# Patient Record
Sex: FEMALE | Race: WHITE | NOT HISPANIC OR LATINO | Employment: OTHER | URBAN - NONMETROPOLITAN AREA
[De-identification: names, ages, dates, MRNs, and addresses within clinical notes are randomized per-mention and may not be internally consistent; named-entity substitution may affect disease eponyms.]

---

## 2023-12-03 PROCEDURE — 93010 ELECTROCARDIOGRAM REPORT: CPT | Performed by: INTERNAL MEDICINE

## 2023-12-15 PROBLEM — R00.2 PALPITATIONS: Status: ACTIVE | Noted: 2023-12-15

## 2023-12-15 PROBLEM — G45.9 TIA (TRANSIENT ISCHEMIC ATTACK): Status: ACTIVE | Noted: 2023-12-15

## 2023-12-15 PROBLEM — I65.23 BILATERAL CAROTID ARTERY STENOSIS: Status: ACTIVE | Noted: 2023-12-15

## 2023-12-15 PROBLEM — R63.6 UNDERWEIGHT: Status: ACTIVE | Noted: 2023-12-15

## 2023-12-15 PROBLEM — I25.10 ATHEROSCLEROSIS OF CORONARY ARTERY OF NATIVE HEART WITHOUT ANGINA PECTORIS: Status: ACTIVE | Noted: 2023-12-15

## 2023-12-15 PROBLEM — E78.5 HYPERLIPIDEMIA: Status: ACTIVE | Noted: 2023-12-15

## 2023-12-15 PROBLEM — Z98.61 STATUS POST CORONARY ANGIOPLASTY: Status: ACTIVE | Noted: 2023-12-15

## 2023-12-15 PROBLEM — E03.9 HYPOTHYROIDISM: Status: ACTIVE | Noted: 2023-12-15

## 2023-12-15 RX ORDER — CLOPIDOGREL BISULFATE 75 MG/1
1 TABLET ORAL DAILY
COMMUNITY
Start: 2021-07-14

## 2023-12-15 RX ORDER — LEVOTHYROXINE SODIUM 50 UG/1
50 TABLET ORAL
COMMUNITY
Start: 2021-09-28

## 2023-12-15 RX ORDER — ATORVASTATIN CALCIUM 40 MG/1
1 TABLET, FILM COATED ORAL NIGHTLY
COMMUNITY
Start: 2021-06-15

## 2023-12-15 RX ORDER — ASPIRIN 81 MG/1
1 TABLET ORAL DAILY
COMMUNITY

## 2023-12-15 RX ORDER — MECLIZINE HYDROCHLORIDE 25 MG/1
12.5 TABLET ORAL 3 TIMES DAILY PRN
COMMUNITY
End: 2023-12-28

## 2023-12-15 RX ORDER — NITROGLYCERIN 0.4 MG/1
TABLET SUBLINGUAL
COMMUNITY

## 2023-12-15 RX ORDER — MIRTAZAPINE 15 MG/1
TABLET, FILM COATED ORAL
COMMUNITY
Start: 2021-06-03 | End: 2023-12-28

## 2023-12-28 ENCOUNTER — OFFICE VISIT (OUTPATIENT)
Dept: CARDIOLOGY | Facility: CLINIC | Age: 88
End: 2023-12-28
Payer: COMMERCIAL

## 2023-12-28 VITALS
HEIGHT: 66 IN | HEART RATE: 60 BPM | BODY MASS INDEX: 15.43 KG/M2 | WEIGHT: 96 LBS | SYSTOLIC BLOOD PRESSURE: 110 MMHG | DIASTOLIC BLOOD PRESSURE: 68 MMHG

## 2023-12-28 DIAGNOSIS — I65.23 BILATERAL CAROTID ARTERY STENOSIS: ICD-10-CM

## 2023-12-28 DIAGNOSIS — G45.9 TIA (TRANSIENT ISCHEMIC ATTACK): ICD-10-CM

## 2023-12-28 DIAGNOSIS — I48.0 PAROXYSMAL ATRIAL FIBRILLATION (MULTI): ICD-10-CM

## 2023-12-28 DIAGNOSIS — R63.6 UNDERWEIGHT: ICD-10-CM

## 2023-12-28 DIAGNOSIS — E78.2 MIXED HYPERLIPIDEMIA: ICD-10-CM

## 2023-12-28 DIAGNOSIS — I25.10 ATHEROSCLEROSIS OF CORONARY ARTERY OF NATIVE HEART WITHOUT ANGINA PECTORIS, UNSPECIFIED VESSEL OR LESION TYPE: ICD-10-CM

## 2023-12-28 DIAGNOSIS — Z98.61 STATUS POST CORONARY ANGIOPLASTY: ICD-10-CM

## 2023-12-28 PROCEDURE — 99214 OFFICE O/P EST MOD 30 MIN: CPT | Performed by: INTERNAL MEDICINE

## 2023-12-28 PROCEDURE — 1159F MED LIST DOCD IN RCRD: CPT | Performed by: INTERNAL MEDICINE

## 2023-12-28 PROCEDURE — 1036F TOBACCO NON-USER: CPT | Performed by: INTERNAL MEDICINE

## 2023-12-28 PROCEDURE — 93000 ELECTROCARDIOGRAM COMPLETE: CPT | Performed by: INTERNAL MEDICINE

## 2023-12-28 RX ORDER — BENZONATATE 100 MG/1
100 CAPSULE ORAL 3 TIMES DAILY PRN
COMMUNITY

## 2023-12-28 RX ORDER — METOPROLOL TARTRATE 25 MG/1
25 TABLET, FILM COATED ORAL DAILY
COMMUNITY
End: 2023-12-28 | Stop reason: SDUPTHER

## 2023-12-28 RX ORDER — METOPROLOL TARTRATE 25 MG/1
25 TABLET, FILM COATED ORAL DAILY
Qty: 90 TABLET | Refills: 3 | Status: SHIPPED | OUTPATIENT
Start: 2023-12-28 | End: 2024-01-09 | Stop reason: SDUPTHER

## 2023-12-28 RX ORDER — GUAIFENESIN 600 MG/1
1200 TABLET, EXTENDED RELEASE ORAL 2 TIMES DAILY
COMMUNITY

## 2023-12-28 NOTE — PATIENT INSTRUCTIONS
Please bring all medicines, vitamins, and herbal supplements with you when you come to the office.    Prescriptions will not be filled unless you are compliant with your follow up appointments or have a follow up appointment scheduled as per instruction of your physician. Refills should be requested at the time of your visit.     EKG done in office today   Same medications  Follow up

## 2023-12-28 NOTE — PROGRESS NOTES
Reason for referral: AFIB    HPI:        Patient is in the office for follow-up for the problems noted below.  She was in the hospital recently with a fall that was accidental and had COVID infection complicated by atrial fibrillation with spontaneous conversion to sinus rhythm.  She came with her 2 sons and she lives with one of them.  She denies any symptoms of chest pain palpitations orthopnea PND or lower extremity edema and she is still independent.  Her medical therapy was reviewed and the admission information was shared with the patient and her sons.  She had an echocardiogram during the admission which was unremarkable.  Her examination today was unremarkable as well.  There will be no changes to her medical therapy.     ASSESSMENT AND PLAN:      1. History of transient ischemic attack, patient had no recurrences on dual antiplatelet therapy.  2. Bilateral carotid disease, less than 70%., Currently on dual antiplatelet therapy and on statin high intensity which we will continue, lipid profile is under control.  3. Hyperlipidemia, will continue  statin using atorvastatin and will follow lipid profile  4. Underweight. Encouraged the patient to gain at least 10 pounds to bring her BMI up.  5. History of coronary artery disease with previous balloon angioplasty more than 30 years ago in [facility] with no indication of recurrent disease of any significant based on cardiac catheterization in December 2018 but with heavy coronary calcifications  6. Hypothyroidism on replacement therapy being monitored  7.  An episode of atrial fibrillation while having COVID infection with spontaneous return to sinus rhythm and no need for anticoagulation or antiarrhythmic therapy.        Bassam Pascual MD, [facility]     Past Medical History:   She has no past medical history on file.    Surgical History:   She has a past surgical history that includes Other surgical history (04/17/2022); Other surgical history  "(04/17/2022); Other surgical history (04/17/2022); Other surgical history (04/17/2022); and Other surgical history (04/17/2022).    Family History:   Family History   Problem Relation Name Age of Onset    No Known Problems Mother      No Known Problems Father      No Known Problems Sister      No Known Problems Brother         Social History:   Social History     Tobacco Use    Smoking status: Never    Smokeless tobacco: Never   Substance Use Topics    Alcohol use: Never        Allergies:  Patient has no known allergies.     Current Medications:    Current Outpatient Medications:     albuterol 90 mcg/actuation aerosol powdr breath activated inhaler, Inhale 2 puffs every 6 hours if needed for wheezing., Disp: , Rfl:     aspirin 81 mg EC tablet, Take 1 tablet (81 mg) by mouth once daily., Disp: , Rfl:     atorvastatin (Lipitor) 40 mg tablet, Take 1 tablet (40 mg) by mouth once daily at bedtime., Disp: , Rfl:     benzonatate (Tessalon) 100 mg capsule, Take 1 capsule (100 mg) by mouth 3 times a day as needed for cough. Do not crush or chew., Disp: , Rfl:     clopidogrel (Plavix) 75 mg tablet, Take 1 tablet (75 mg) by mouth once daily., Disp: , Rfl:     guaiFENesin (Mucinex) 600 mg 12 hr tablet, Take 2 tablets (1,200 mg) by mouth 2 times a day. Do not crush, chew, or split., Disp: , Rfl:     levothyroxine (Synthroid, Levoxyl) 50 mcg tablet, Take 1 tablet (50 mcg) by mouth once daily in the morning. Take before meals., Disp: , Rfl:     nitroglycerin (Nitrostat) 0.4 mg SL tablet, Place under the tongue., Disp: , Rfl:     metoprolol tartrate (Lopressor) 25 mg tablet, Take 1 tablet (25 mg) by mouth once daily., Disp: 90 tablet, Rfl: 3     Vitals:  Visit Vitals  /68 (BP Location: Right arm, Patient Position: Sitting)   Pulse 60   Ht 1.664 m (5' 5.5\")   Wt (!) 43.5 kg (96 lb)   BMI 15.73 kg/m²   Smoking Status Never   BSA 1.42 m²         Review of Systems   All other systems reviewed and are negative.      Objective "         Physical Exam  Constitutional:       Appearance: Normal appearance. She is normal weight.   HENT:      Nose: Nose normal.   Neck:      Vascular: No carotid bruit.   Cardiovascular:      Rate and Rhythm: Normal rate.      Pulses: Normal pulses.      Heart sounds: Normal heart sounds.   Pulmonary:      Effort: Pulmonary effort is normal.   Abdominal:      General: Bowel sounds are normal.      Palpations: Abdomen is soft.   Genitourinary:     Rectum: Normal.   Musculoskeletal:         General: Normal range of motion.      Cervical back: Normal range of motion.      Right lower leg: No edema.      Left lower leg: No edema.   Skin:     General: Skin is warm and dry.   Neurological:      General: No focal deficit present.      Mental Status: She is alert.   Psychiatric:         Mood and Affect: Mood normal.         Behavior: Behavior normal.         Thought Content: Thought content normal.         Judgment: Judgment normal.              EKG done in office today   Assessment and Plan:   1. Paroxysmal atrial fibrillation (CMS/HCC)  Follow Up In Cardiology    ECG 12 Lead    Basic Metabolic Panel    CBC    metoprolol tartrate (Lopressor) 25 mg tablet    Basic Metabolic Panel    CBC      2. Atherosclerosis of coronary artery of native heart without angina pectoris, unspecified vessel or lesion type  Follow Up In Cardiology    Alanine Aminotransferase    Aspartate Aminotransferase    CBC    Lipid Panel    metoprolol tartrate (Lopressor) 25 mg tablet    Alanine Aminotransferase    Aspartate Aminotransferase    CBC    Lipid Panel      3. Status post coronary angioplasty  Follow Up In Cardiology      4. Bilateral carotid artery stenosis        5. Mixed hyperlipidemia  Alanine Aminotransferase    Aspartate Aminotransferase    Lipid Panel    Alanine Aminotransferase    Aspartate Aminotransferase    Lipid Panel      6. Underweight        7. TIA (transient ischemic attack)

## 2023-12-28 NOTE — LETTER
December 28, 2023     Jessika Schwartz DO  [address]  [place] [address]    Patient: Mary Marquez   YOB: 1932   Date of Visit: 12/28/2023       Dear Dr. Jessika Schwartz DO:    Thank you for referring Mary Marquez to me for evaluation. Below are my notes for this consultation.  If you have questions, please do not hesitate to call me. I look forward to following your patient along with you.       Sincerely,     Bassam Pascual MD      CC: No Recipients  ______________________________________________________________________________________        Reason for referral: AFIB    HPI:        Patient is in the office for follow-up for the problems noted below.  She was in the hospital recently with a fall that was accidental and had COVID infection complicated by atrial fibrillation with spontaneous conversion to sinus rhythm.  She came with her 2 sons and she lives with one of them.  She denies any symptoms of chest pain palpitations orthopnea PND or lower extremity edema and she is still independent.  Her medical therapy was reviewed and the admission information was shared with the patient and her sons.  She had an echocardiogram during the admission which was unremarkable.  Her examination today was unremarkable as well.  There will be no changes to her medical therapy.     ASSESSMENT AND PLAN:      1. History of transient ischemic attack, patient had no recurrences on dual antiplatelet therapy.  2. Bilateral carotid disease, less than 70%., Currently on dual antiplatelet therapy and on statin high intensity which we will continue, lipid profile is under control.  3. Hyperlipidemia, will continue  statin using atorvastatin and will follow lipid profile  4. Underweight. Encouraged the patient to gain at least 10 pounds to bring her BMI up.  5. History of coronary artery disease with previous balloon angioplasty more than 30 years ago in [facility] with no indication of  recurrent disease of any significant based on cardiac catheterization in December 2018 but with heavy coronary calcifications  6. Hypothyroidism on replacement therapy being monitored  7.  An episode of atrial fibrillation while having COVID infection with spontaneous return to sinus rhythm and no need for anticoagulation or antiarrhythmic therapy.        Bassam Pascual MD, [facility]     Past Medical History:   She has no past medical history on file.    Surgical History:   She has a past surgical history that includes Other surgical history (04/17/2022); Other surgical history (04/17/2022); Other surgical history (04/17/2022); Other surgical history (04/17/2022); and Other surgical history (04/17/2022).    Family History:   Family History   Problem Relation Name Age of Onset   • No Known Problems Mother     • No Known Problems Father     • No Known Problems Sister     • No Known Problems Brother         Social History:   Social History     Tobacco Use   • Smoking status: Never   • Smokeless tobacco: Never   Substance Use Topics   • Alcohol use: Never        Allergies:  Patient has no known allergies.     Current Medications:    Current Outpatient Medications:   •  albuterol 90 mcg/actuation aerosol powdr breath activated inhaler, Inhale 2 puffs every 6 hours if needed for wheezing., Disp: , Rfl:   •  aspirin 81 mg EC tablet, Take 1 tablet (81 mg) by mouth once daily., Disp: , Rfl:   •  atorvastatin (Lipitor) 40 mg tablet, Take 1 tablet (40 mg) by mouth once daily at bedtime., Disp: , Rfl:   •  benzonatate (Tessalon) 100 mg capsule, Take 1 capsule (100 mg) by mouth 3 times a day as needed for cough. Do not crush or chew., Disp: , Rfl:   •  clopidogrel (Plavix) 75 mg tablet, Take 1 tablet (75 mg) by mouth once daily., Disp: , Rfl:   •  guaiFENesin (Mucinex) 600 mg 12 hr tablet, Take 2 tablets (1,200 mg) by mouth 2 times a day. Do not crush, chew, or split., Disp: , Rfl:   •  levothyroxine (Synthroid, Levoxyl) 50 mcg  "tablet, Take 1 tablet (50 mcg) by mouth once daily in the morning. Take before meals., Disp: , Rfl:   •  nitroglycerin (Nitrostat) 0.4 mg SL tablet, Place under the tongue., Disp: , Rfl:   •  metoprolol tartrate (Lopressor) 25 mg tablet, Take 1 tablet (25 mg) by mouth once daily., Disp: 90 tablet, Rfl: 3     Vitals:  Visit Vitals  /68 (BP Location: Right arm, Patient Position: Sitting)   Pulse 60   Ht 1.664 m (5' 5.5\")   Wt (!) 43.5 kg (96 lb)   BMI 15.73 kg/m²   Smoking Status Never   BSA 1.42 m²         Review of Systems   All other systems reviewed and are negative.      Objective        Physical Exam  Constitutional:       Appearance: Normal appearance. She is normal weight.   HENT:      Nose: Nose normal.   Neck:      Vascular: No carotid bruit.   Cardiovascular:      Rate and Rhythm: Normal rate.      Pulses: Normal pulses.      Heart sounds: Normal heart sounds.   Pulmonary:      Effort: Pulmonary effort is normal.   Abdominal:      General: Bowel sounds are normal.      Palpations: Abdomen is soft.   Genitourinary:     Rectum: Normal.   Musculoskeletal:         General: Normal range of motion.      Cervical back: Normal range of motion.      Right lower leg: No edema.      Left lower leg: No edema.   Skin:     General: Skin is warm and dry.   Neurological:      General: No focal deficit present.      Mental Status: She is alert.   Psychiatric:         Mood and Affect: Mood normal.         Behavior: Behavior normal.         Thought Content: Thought content normal.         Judgment: Judgment normal.              EKG done in office today   Assessment and Plan:   1. Paroxysmal atrial fibrillation (CMS/HCC)  Follow Up In Cardiology    ECG 12 Lead    Basic Metabolic Panel    CBC    metoprolol tartrate (Lopressor) 25 mg tablet    Basic Metabolic Panel    CBC      2. Atherosclerosis of coronary artery of native heart without angina pectoris, unspecified vessel or lesion type  Follow Up In Cardiology    Alanine " Aminotransferase    Aspartate Aminotransferase    CBC    Lipid Panel    metoprolol tartrate (Lopressor) 25 mg tablet    Alanine Aminotransferase    Aspartate Aminotransferase    CBC    Lipid Panel      3. Status post coronary angioplasty  Follow Up In Cardiology      4. Bilateral carotid artery stenosis        5. Mixed hyperlipidemia  Alanine Aminotransferase    Aspartate Aminotransferase    Lipid Panel    Alanine Aminotransferase    Aspartate Aminotransferase    Lipid Panel      6. Underweight        7. TIA (transient ischemic attack)

## 2024-01-09 DIAGNOSIS — I25.10 ATHEROSCLEROSIS OF CORONARY ARTERY OF NATIVE HEART WITHOUT ANGINA PECTORIS, UNSPECIFIED VESSEL OR LESION TYPE: ICD-10-CM

## 2024-01-09 DIAGNOSIS — I48.0 PAROXYSMAL ATRIAL FIBRILLATION (MULTI): ICD-10-CM

## 2024-01-09 RX ORDER — METOPROLOL TARTRATE 25 MG/1
25 TABLET, FILM COATED ORAL 2 TIMES DAILY
Qty: 180 TABLET | Refills: 3 | Status: SHIPPED | OUTPATIENT
Start: 2024-01-09

## 2024-01-09 NOTE — TELEPHONE ENCOUNTER
Patient Herbert Santo phoned in states metoprolol tartrate rx was sent in incorrectly. Patient was discharged from hospital on metoprolol tart 25mg bid. This is what she had been on. List updated. Rx sent to Dr. Bassam Pascual MD

## 2024-06-25 LAB
NON-UH HIE ALANINE AMINOTRANSFERASE:CCNC:PT:SER/PLAS:QN:NO ADDITION OF P-5': 18 INT._UNIT/L (ref 6–46)
NON-UH HIE ANION GAP:SCNC:PT:SER/PLAS:QN:: 12 MEQ/L (ref 6–16)
NON-UH HIE ASPARTATE AMINOTRANSFERASE:CCNC:PT:SER/PLAS:QN:: 28 INT._UNIT/L (ref 5–43)
NON-UH HIE CALCIUM:MCNC:PT:SER/PLAS:QN:: 9.4 MG/DL (ref 8.9–11.1)
NON-UH HIE CARBON DIOXIDE:SCNC:PT:SER/PLAS:QN:: 25 MMOL/L (ref 21–31)
NON-UH HIE CHLORIDE:SCNC:PT:SER/PLAS:QN:: 103 MMOL/L (ref 101–111)
NON-UH HIE CHOLESTEROL.IN HDL:MCNC:PT:SER/PLAS:QN:: 57 MG/DL
NON-UH HIE CHOLESTEROL.IN LDL:MCNC:PT:SER/PLAS:QN:: 67 MG/DL
NON-UH HIE CHOLESTEROL.IN VLDL:MCNC:PT:SER/PLAS:QN:CALCULATED: 16 MG/DL (ref 7–40)
NON-UH HIE CHOLESTEROL:MCNC:PT:SER/PLAS:QN:: 139 MG/DL (ref 120–200)
NON-UH HIE CREATININE:MCNC:PT:SER/PLAS:QN:: 1 MG/DL (ref 0.5–1.3)
NON-UH HIE EGFR: 53 ML/MIN/1.73 M2
NON-UH HIE ERYTHROCYTE DISTRIBUTION WIDTH:RATIO:PT:RBC:QN:AUTOMATED COUNT: 16.3 % (ref 10.9–14.2)
NON-UH HIE ERYTHROCYTE MEAN CORPUSCULAR HEMOGLOBIN CONCENTRATION:MCNC:PT:RB: 33.6 GM/DL (ref 31.4–36)
NON-UH HIE ERYTHROCYTE MEAN CORPUSCULAR HEMOGLOBIN:ENTMASS:PT:RBC:QN:AUTOMA: 30.8 PG (ref 27–34)
NON-UH HIE ERYTHROCYTE MEAN CORPUSCULAR VOLUME:ENTVOL:PT:RBC:QN:AUTOMATED C: 91.6 FL (ref 80–100)
NON-UH HIE ERYTHROCYTE SIZE:MORPH:PT:BLD:NOM:: NORMAL
NON-UH HIE ERYTHROCYTES:NCNC:PT:BLD:QN:AUTOMATED COUNT: 4.5 E12/L (ref 4.3–5.9)
NON-UH HIE GLUCOSE:MCNC:PT:SER/PLAS:QN:: 103 MG/DL (ref 55–199)
NON-UH HIE HEMATOCRIT:VFR:PT:BLD:QN:AUTOMATED COUNT: 41.3 % (ref 34–46)
NON-UH HIE HEMOGLOBIN:MCNC:PT:BLD:QN:: 13.9 GM/DL (ref 12–16)
NON-UH HIE LEUKOCYTES: 4.9 E9/L (ref 4–11)
NON-UH HIE PLATELET MEAN VOLUME:ENTVOL:PT:BLD:QN:AUTOMATED COUNT: 8.1 FL (ref 6.4–10.8)
NON-UH HIE PLATELET: 149 E9/L (ref 150–500)
NON-UH HIE POTASSIUM:SCNC:PT:SER/PLAS:QN:: 4 MMOL/L (ref 3.5–5.3)
NON-UH HIE SODIUM:SCNC:PT:SER/PLAS:QN:: 136 MMOL/L (ref 135–145)
NON-UH HIE TRIGLYCERIDE:MCNC:PT:SER/PLAS:QN:: 82 MG/DL
NON-UH HIE UREA NITROGEN/CREATININE:MRTO:PT:SER/PLAS:QN:: 14 NO UNITS (ref 10–20)
NON-UH HIE UREA NITROGEN:MCNC:PT:SER/PLAS:QN:: 14 MG/DL (ref 5–21)

## 2024-07-11 ENCOUNTER — APPOINTMENT (OUTPATIENT)
Dept: CARDIOLOGY | Facility: CLINIC | Age: 89
End: 2024-07-11
Payer: COMMERCIAL

## 2024-07-11 VITALS
WEIGHT: 96 LBS | HEART RATE: 64 BPM | BODY MASS INDEX: 15.99 KG/M2 | HEIGHT: 65 IN | DIASTOLIC BLOOD PRESSURE: 64 MMHG | SYSTOLIC BLOOD PRESSURE: 128 MMHG

## 2024-07-11 DIAGNOSIS — I25.10 ATHEROSCLEROSIS OF CORONARY ARTERY OF NATIVE HEART WITHOUT ANGINA PECTORIS, UNSPECIFIED VESSEL OR LESION TYPE: ICD-10-CM

## 2024-07-11 DIAGNOSIS — R63.6 UNDERWEIGHT: ICD-10-CM

## 2024-07-11 DIAGNOSIS — I65.23 BILATERAL CAROTID ARTERY STENOSIS: ICD-10-CM

## 2024-07-11 DIAGNOSIS — Z78.9 NEVER SMOKED TOBACCO: ICD-10-CM

## 2024-07-11 DIAGNOSIS — Z98.61 STATUS POST CORONARY ANGIOPLASTY: ICD-10-CM

## 2024-07-11 DIAGNOSIS — I48.0 PAROXYSMAL ATRIAL FIBRILLATION (MULTI): ICD-10-CM

## 2024-07-11 DIAGNOSIS — E78.2 MIXED HYPERLIPIDEMIA: ICD-10-CM

## 2024-07-11 DIAGNOSIS — G45.9 TIA (TRANSIENT ISCHEMIC ATTACK): ICD-10-CM

## 2024-07-11 PROCEDURE — 1159F MED LIST DOCD IN RCRD: CPT | Performed by: INTERNAL MEDICINE

## 2024-07-11 PROCEDURE — 99214 OFFICE O/P EST MOD 30 MIN: CPT | Performed by: INTERNAL MEDICINE

## 2024-07-11 PROCEDURE — 1036F TOBACCO NON-USER: CPT | Performed by: INTERNAL MEDICINE

## 2024-07-11 RX ORDER — MIRTAZAPINE 15 MG/1
7.5 TABLET, FILM COATED ORAL NIGHTLY
COMMUNITY
Start: 2024-06-03

## 2024-07-11 ASSESSMENT — ENCOUNTER SYMPTOMS: DIZZINESS: 1

## 2024-07-11 NOTE — PATIENT INSTRUCTIONS
Please bring all medicines, vitamins, and herbal supplements with you when you come to the office.    Prescriptions will not be filled unless you are compliant with your follow up appointments or have a follow up appointment scheduled as per instruction of your physician. Refills should be requested at the time of your visit.     BMI was below normal measurement. Current weight: (!) 43.5 kg (96 lb)  Weight change since last visit (-) denotes wt loss 0 lbs   Weight gain needed to achieve  BMI of 18.5 = 14.9 Lbs    Provided dietary education for weight gain to the patient.

## 2024-07-11 NOTE — LETTER
July 11, 2024     Jessika Schwartz DO  [address]  [place] [address]    Patient: Mary Maruqez   YOB: 1932   Date of Visit: 7/11/2024       Dear Dr. Jessika Schwartz DO:    Thank you for referring Mary Marquez to me for evaluation. Below are my notes for this consultation.  If you have questions, please do not hesitate to call me. I look forward to following your patient along with you.       Sincerely,     Bassam Pascual MD      CC: No Recipients  ______________________________________________________________________________________    Subjective   Mary Marquez is a 89 y.o. female       Chief Complaint    Follow-up          HPI   Patient is in the office for follow-up for the problems noted below came with her son's and she lives with one of them.  Since her last visit several months ago she has had no cardiac events whatsoever.  The son indicated that since COVID she had last year she had slight drop in her cognitive function but I could not recognize the difference today.  She appears to be very sharp still.  She is still ambulating despite her age.  Her weight has not changed from last visit at 96 pounds.  Blood work from June 2024 which I reviewed and shared with the patient was unremarkable.  Her present medical therapy has been well-tolerated and effective.  There has been no indication of recurrent TIA or bleeding complications no recent falls.    ASSESSMENT AND PLAN:      1. History of transient ischemic attack, patient had no recurrences on dual antiplatelet therapy.  2. Bilateral carotid disease, less than 70%., Currently on dual antiplatelet therapy and on statin high intensity which we will continue, lipid profile is under control.  3. Hyperlipidemia, will continue  statin using atorvastatin, LDL cholesterol below 70 mg/dL  4. Underweight. Encouraged the patient to increase her oral intake  5. History of coronary artery disease with previous balloon angioplasty  "more than 30 years ago in [facility] with no indication of recurrent disease of any significant based on cardiac catheterization in December 2018 but with heavy coronary calcifications  6. Hypothyroidism on replacement therapy being monitored  7.  An episode of atrial fibrillation while having COVID infection with spontaneous return to sinus rhythm and no need for anticoagulation or antiarrhythmic therapy.        Bassam Pascual MD, [facility]   Review of Systems   Cardiovascular:  Positive for leg swelling.   Neurological:  Positive for dizziness.   All other systems reviewed and are negative.           Vitals:    07/11/24 1144   BP: 128/64   BP Location: Right arm   Patient Position: Sitting   Pulse: 64   Weight: (!) 43.5 kg (96 lb)   Height: 1.651 m (5' 5\")        Objective   Physical Exam  Constitutional:       Appearance: Normal appearance.   HENT:      Nose: Nose normal.   Neck:      Vascular: No carotid bruit.   Cardiovascular:      Rate and Rhythm: Normal rate.      Pulses: Normal pulses.      Heart sounds: Normal heart sounds.   Pulmonary:      Effort: Pulmonary effort is normal.   Abdominal:      General: Bowel sounds are normal.      Palpations: Abdomen is soft.   Musculoskeletal:         General: Normal range of motion.      Cervical back: Normal range of motion.      Right lower leg: No edema.      Left lower leg: No edema.   Skin:     General: Skin is warm and dry.   Neurological:      General: No focal deficit present.      Mental Status: She is alert.   Psychiatric:         Mood and Affect: Mood normal.         Behavior: Behavior normal.         Thought Content: Thought content normal.         Judgment: Judgment normal.         Allergies  Patient has no known allergies.     Current Medications    Current Outpatient Medications:   •  aspirin 81 mg EC tablet, Take 1 tablet (81 mg) by mouth once daily., Disp: , Rfl:   •  atorvastatin (Lipitor) 40 mg tablet, Take 1 tablet (40 mg) by mouth once daily " at bedtime., Disp: , Rfl:   •  clopidogrel (Plavix) 75 mg tablet, Take 1 tablet (75 mg) by mouth once daily., Disp: , Rfl:   •  levothyroxine (Synthroid, Levoxyl) 50 mcg tablet, Take 1 tablet (50 mcg) by mouth once daily in the morning. Take before meals., Disp: , Rfl:   •  metoprolol tartrate (Lopressor) 25 mg tablet, Take 1 tablet (25 mg) by mouth 2 times a day., Disp: 180 tablet, Rfl: 3  •  mirtazapine (Remeron) 15 mg tablet, Take 0.5 tablets (7.5 mg) by mouth once daily at bedtime., Disp: , Rfl:   •  nitroglycerin (Nitrostat) 0.4 mg SL tablet, Place under the tongue., Disp: , Rfl:                      Assessment/Plan   1. Atherosclerosis of coronary artery of native heart without angina pectoris, unspecified vessel or lesion type  Follow Up In Cardiology      2. Paroxysmal atrial fibrillation (Multi)  Follow Up In Cardiology      3. Status post coronary angioplasty  Follow Up In Cardiology      4. Bilateral carotid artery stenosis        5. Mixed hyperlipidemia        6. TIA (transient ischemic attack)        7. Never smoked tobacco        8. Underweight                 Scribe Attestation  By signing my name below, IRashida LPN, Scribe   attest that this documentation has been prepared under the direction and in the presence of Bassam Pascual MD.     Provider Attestation - Scribe documentation    All medical record entries made by the Scribe were at my direction and personally dictated by me. I have reviewed the chart and agree that the record accurately reflects my personal performance of the history, physical exam, discussion and plan.

## 2024-07-11 NOTE — PROGRESS NOTES
Subjective   Mary Marquez is a 89 y.o. female       Chief Complaint    Follow-up          HPI   Patient is in the office for follow-up for the problems noted below came with her son's and she lives with one of them.  Since her last visit several months ago she has had no cardiac events whatsoever.  The son indicated that since COVID she had last year she had slight drop in her cognitive function but I could not recognize the difference today.  She appears to be very sharp still.  She is still ambulating despite her age.  Her weight has not changed from last visit at 96 pounds.  Blood work from June 2024 which I reviewed and shared with the patient was unremarkable.  Her present medical therapy has been well-tolerated and effective.  There has been no indication of recurrent TIA or bleeding complications no recent falls.    ASSESSMENT AND PLAN:      1. History of transient ischemic attack, patient had no recurrences on dual antiplatelet therapy.  2. Bilateral carotid disease, less than 70%., Currently on dual antiplatelet therapy and on statin high intensity which we will continue, lipid profile is under control.  3. Hyperlipidemia, will continue  statin using atorvastatin, LDL cholesterol below 70 mg/dL  4. Underweight. Encouraged the patient to increase her oral intake  5. History of coronary artery disease with previous balloon angioplasty more than 30 years ago in [facility] with no indication of recurrent disease of any significant based on cardiac catheterization in December 2018 but with heavy coronary calcifications  6. Hypothyroidism on replacement therapy being monitored  7.  An episode of atrial fibrillation while having COVID infection with spontaneous return to sinus rhythm and no need for anticoagulation or antiarrhythmic therapy.        Bassam Pascual MD, FACC   Review of Systems   Cardiovascular:  Positive for leg swelling.   Neurological:  Positive for dizziness.   All other systems  "reviewed and are negative.           Vitals:    07/11/24 1144   BP: 128/64   BP Location: Right arm   Patient Position: Sitting   Pulse: 64   Weight: (!) 43.5 kg (96 lb)   Height: 1.651 m (5' 5\")        Objective   Physical Exam  Constitutional:       Appearance: Normal appearance.   HENT:      Nose: Nose normal.   Neck:      Vascular: No carotid bruit.   Cardiovascular:      Rate and Rhythm: Normal rate.      Pulses: Normal pulses.      Heart sounds: Normal heart sounds.   Pulmonary:      Effort: Pulmonary effort is normal.   Abdominal:      General: Bowel sounds are normal.      Palpations: Abdomen is soft.   Musculoskeletal:         General: Normal range of motion.      Cervical back: Normal range of motion.      Right lower leg: No edema.      Left lower leg: No edema.   Skin:     General: Skin is warm and dry.   Neurological:      General: No focal deficit present.      Mental Status: She is alert.   Psychiatric:         Mood and Affect: Mood normal.         Behavior: Behavior normal.         Thought Content: Thought content normal.         Judgment: Judgment normal.         Allergies  Patient has no known allergies.     Current Medications    Current Outpatient Medications:     aspirin 81 mg EC tablet, Take 1 tablet (81 mg) by mouth once daily., Disp: , Rfl:     atorvastatin (Lipitor) 40 mg tablet, Take 1 tablet (40 mg) by mouth once daily at bedtime., Disp: , Rfl:     clopidogrel (Plavix) 75 mg tablet, Take 1 tablet (75 mg) by mouth once daily., Disp: , Rfl:     levothyroxine (Synthroid, Levoxyl) 50 mcg tablet, Take 1 tablet (50 mcg) by mouth once daily in the morning. Take before meals., Disp: , Rfl:     metoprolol tartrate (Lopressor) 25 mg tablet, Take 1 tablet (25 mg) by mouth 2 times a day., Disp: 180 tablet, Rfl: 3    mirtazapine (Remeron) 15 mg tablet, Take 0.5 tablets (7.5 mg) by mouth once daily at bedtime., Disp: , Rfl:     nitroglycerin (Nitrostat) 0.4 mg SL tablet, Place under the tongue., Disp: , " Rfl:                      Assessment/Plan   1. Atherosclerosis of coronary artery of native heart without angina pectoris, unspecified vessel or lesion type  Follow Up In Cardiology      2. Paroxysmal atrial fibrillation (Multi)  Follow Up In Cardiology      3. Status post coronary angioplasty  Follow Up In Cardiology      4. Bilateral carotid artery stenosis        5. Mixed hyperlipidemia        6. TIA (transient ischemic attack)        7. Never smoked tobacco        8. Underweight                 Scribe Attestation  By signing my name below, Rashida HI LPN, Scribe   attest that this documentation has been prepared under the direction and in the presence of Bassam Pascual MD.     Provider Attestation - Scribe documentation    All medical record entries made by the Scribe were at my direction and personally dictated by me. I have reviewed the chart and agree that the record accurately reflects my personal performance of the history, physical exam, discussion and plan.

## 2025-01-09 DIAGNOSIS — I25.10 ATHEROSCLEROSIS OF CORONARY ARTERY OF NATIVE HEART WITHOUT ANGINA PECTORIS, UNSPECIFIED VESSEL OR LESION TYPE: ICD-10-CM

## 2025-01-09 DIAGNOSIS — I48.0 PAROXYSMAL ATRIAL FIBRILLATION (MULTI): ICD-10-CM

## 2025-01-09 RX ORDER — METOPROLOL TARTRATE 25 MG/1
25 TABLET, FILM COATED ORAL 2 TIMES DAILY
Qty: 180 TABLET | Refills: 3 | Status: SHIPPED | OUTPATIENT
Start: 2025-01-09 | End: 2026-01-09

## 2025-03-13 ENCOUNTER — APPOINTMENT (OUTPATIENT)
Dept: CARDIOLOGY | Facility: CLINIC | Age: OVER 89
End: 2025-03-13
Payer: COMMERCIAL

## 2025-03-13 VITALS
HEART RATE: 60 BPM | DIASTOLIC BLOOD PRESSURE: 56 MMHG | BODY MASS INDEX: 16.83 KG/M2 | HEIGHT: 65 IN | WEIGHT: 101 LBS | SYSTOLIC BLOOD PRESSURE: 100 MMHG

## 2025-03-13 DIAGNOSIS — Z78.9 NEVER SMOKED TOBACCO: ICD-10-CM

## 2025-03-13 DIAGNOSIS — I48.0 PAROXYSMAL ATRIAL FIBRILLATION (MULTI): ICD-10-CM

## 2025-03-13 DIAGNOSIS — I65.23 BILATERAL CAROTID ARTERY STENOSIS: ICD-10-CM

## 2025-03-13 DIAGNOSIS — G45.9 TIA (TRANSIENT ISCHEMIC ATTACK): ICD-10-CM

## 2025-03-13 DIAGNOSIS — R63.6 UNDERWEIGHT: ICD-10-CM

## 2025-03-13 DIAGNOSIS — I25.10 ATHEROSCLEROSIS OF CORONARY ARTERY OF NATIVE HEART WITHOUT ANGINA PECTORIS, UNSPECIFIED VESSEL OR LESION TYPE: ICD-10-CM

## 2025-03-13 DIAGNOSIS — E78.2 MIXED HYPERLIPIDEMIA: ICD-10-CM

## 2025-03-13 DIAGNOSIS — Z98.61 STATUS POST CORONARY ANGIOPLASTY: ICD-10-CM

## 2025-03-13 PROCEDURE — 99214 OFFICE O/P EST MOD 30 MIN: CPT | Performed by: INTERNAL MEDICINE

## 2025-03-13 PROCEDURE — 1159F MED LIST DOCD IN RCRD: CPT | Performed by: INTERNAL MEDICINE

## 2025-03-13 NOTE — LETTER
March 13, 2025     JESSICA Mcdaniel  [address] [facility] [address]    Patient: Mary Marquez   YOB: 1932   Date of Visit: 3/13/2025       Dear CHRISTEN Hill-CNP:    Thank you for referring Mary Marquez to me for evaluation. Below are my notes for this consultation.  If you have questions, please do not hesitate to call me. I look forward to following your patient along with you.       Sincerely,     Bassam Pascual MD      CC: No Recipients  ______________________________________________________________________________________    Subjective   Mary Marquez is a 89 y.o. female       Chief Complaint    Follow-up          HPI   Patient is in the office for follow-up for coronary artery disease and previous angioplasty who also has bilateral carotid disease and previous TIA with hypertension and hyperlipidemia.  She came with her 2 sons, she lives with one of them.  They tell me that she has been for the most part sedentary and does not do much around the house.  She makes her bed.  She does not travel outside.  She has not fallen down and has had no symptoms suggestive of TIA.  She had occasional subconjunctival bleed which is very insignificant and not concerning.  She is due for blood work which is scheduled.  Medications have been well-tolerated and the patient has been compliant.  There has been no indication of angina pectoris.  Short-term memory has been a problem for her recently.    ASSESSMENT AND PLAN:      1. History of transient ischemic attack, patient had no recurrences on dual antiplatelet therapy.  2. Bilateral carotid disease, less than 70%., Currently on dual antiplatelet therapy and on statin high intensity which we will continue, lipid profile is under control.  3. Hyperlipidemia, will continue  statin using atorvastatin, LDL cholesterol below 70 mg/dL, lipid profile is ordered  4. Underweight. Encouraged the patient to increase her  "oral intake, her weight has increased 6 pounds from last visit but she is nice  5. History of coronary artery disease with previous balloon angioplasty more than 30 years ago in [facility] with no indication of recurrent disease of any significant based on cardiac catheterization in December 2018 but with heavy coronary calcifications  6. Hypothyroidism on replacement therapy being monitored  7.  An episode of atrial fibrillation while having COVID infection with spontaneous return to sinus rhythm and no need for anticoagulation or antiarrhythmic therapy.     ROS         Vitals:    03/13/25 1115   BP: 100/56   BP Location: Right arm   Patient Position: Sitting   Pulse: 60   Weight: 45.8 kg (101 lb)   Height: 1.651 m (5' 5\")        Objective   Physical Exam  Constitutional:       Appearance: Normal appearance.   HENT:      Nose: Nose normal.   Neck:      Vascular: No carotid bruit.   Cardiovascular:      Rate and Rhythm: Normal rate.      Pulses: Normal pulses.      Heart sounds: Normal heart sounds.   Pulmonary:      Effort: Pulmonary effort is normal.   Abdominal:      General: Bowel sounds are normal.      Palpations: Abdomen is soft.   Musculoskeletal:         General: Normal range of motion.      Cervical back: Normal range of motion.      Right lower leg: No edema.      Left lower leg: No edema.   Skin:     General: Skin is warm and dry.   Neurological:      General: No focal deficit present.      Mental Status: She is alert.   Psychiatric:         Mood and Affect: Mood normal.         Behavior: Behavior normal.         Thought Content: Thought content normal.         Judgment: Judgment normal.         Allergies  Patient has no known allergies.     Current Medications    Current Outpatient Medications:   •  aspirin 81 mg EC tablet, Take 1 tablet (81 mg) by mouth once daily., Disp: , Rfl:   •  atorvastatin (Lipitor) 40 mg tablet, Take 1 tablet (40 mg) by mouth once daily at bedtime., Disp: , Rfl:   •  " clopidogrel (Plavix) 75 mg tablet, Take 1 tablet (75 mg) by mouth once daily., Disp: , Rfl:   •  levothyroxine (Synthroid, Levoxyl) 50 mcg tablet, Take 1 tablet (50 mcg) by mouth once daily in the morning. Take before meals., Disp: , Rfl:   •  metoprolol tartrate (Lopressor) 25 mg tablet, Take 1 tablet (25 mg) by mouth 2 times a day., Disp: 180 tablet, Rfl: 3  •  mirtazapine (Remeron) 15 mg tablet, Take 0.5 tablets (7.5 mg) by mouth once daily at bedtime., Disp: , Rfl:   •  nitroglycerin (Nitrostat) 0.4 mg SL tablet, Place under the tongue., Disp: , Rfl:                      Assessment/Plan   1. Atherosclerosis of coronary artery of native heart without angina pectoris, unspecified vessel or lesion type  Follow Up In Cardiology    Alanine Aminotransferase    Aspartate Aminotransferase    Basic Metabolic Panel    CBC    Lipid Panel    Follow Up In Cardiology    Alanine Aminotransferase    Aspartate Aminotransferase    Basic Metabolic Panel    CBC    Lipid Panel      2. Paroxysmal atrial fibrillation (Multi)  Basic Metabolic Panel    CBC    Basic Metabolic Panel    CBC      3. Bilateral carotid artery stenosis        4. Mixed hyperlipidemia  Alanine Aminotransferase    Aspartate Aminotransferase    Lipid Panel    Alanine Aminotransferase    Aspartate Aminotransferase    Lipid Panel      5. Status post coronary angioplasty        6. TIA (transient ischemic attack)        7. Underweight        8. Never smoked tobacco                 Scribe Attestation  By signing my name below, Rashida HI LPN, Scribe   attest that this documentation has been prepared under the direction and in the presence of Bassam Pascual MD.     Provider Attestation - Scribe documentation    All medical record entries made by the Scribe were at my direction and personally dictated by me. I have reviewed the chart and agree that the record accurately reflects my personal performance of the history, physical exam, discussion and plan.

## 2025-03-13 NOTE — PROGRESS NOTES
Subjective   Mary Marquez is a 89 y.o. female       Chief Complaint    Follow-up          HPI   Patient is in the office for follow-up for coronary artery disease and previous angioplasty who also has bilateral carotid disease and previous TIA with hypertension and hyperlipidemia.  She came with her 2 sons, she lives with one of them.  They tell me that she has been for the most part sedentary and does not do much around the house.  She makes her bed.  She does not travel outside.  She has not fallen down and has had no symptoms suggestive of TIA.  She had occasional subconjunctival bleed which is very insignificant and not concerning.  She is due for blood work which is scheduled.  Medications have been well-tolerated and the patient has been compliant.  There has been no indication of angina pectoris.  Short-term memory has been a problem for her recently.    ASSESSMENT AND PLAN:      1. History of transient ischemic attack, patient had no recurrences on dual antiplatelet therapy.  2. Bilateral carotid disease, less than 70%., Currently on dual antiplatelet therapy and on statin high intensity which we will continue, lipid profile is under control.  3. Hyperlipidemia, will continue  statin using atorvastatin, LDL cholesterol below 70 mg/dL, lipid profile is ordered  4. Underweight. Encouraged the patient to increase her oral intake, her weight has increased 6 pounds from last visit but she is nice  5. History of coronary artery disease with previous balloon angioplasty more than 30 years ago in [facility] with no indication of recurrent disease of any significant based on cardiac catheterization in December 2018 but with heavy coronary calcifications  6. Hypothyroidism on replacement therapy being monitored  7.  An episode of atrial fibrillation while having COVID infection with spontaneous return to sinus rhythm and no need for anticoagulation or antiarrhythmic therapy.     [facility]         Vitals:    03/13/25  "1115   BP: 100/56   BP Location: Right arm   Patient Position: Sitting   Pulse: 60   Weight: 45.8 kg (101 lb)   Height: 1.651 m (5' 5\")        Objective   Physical Exam  Constitutional:       Appearance: Normal appearance.   HENT:      Nose: Nose normal.   Neck:      Vascular: No carotid bruit.   Cardiovascular:      Rate and Rhythm: Normal rate.      Pulses: Normal pulses.      Heart sounds: Normal heart sounds.   Pulmonary:      Effort: Pulmonary effort is normal.   Abdominal:      General: Bowel sounds are normal.      Palpations: Abdomen is soft.   Musculoskeletal:         General: Normal range of motion.      Cervical back: Normal range of motion.      Right lower leg: No edema.      Left lower leg: No edema.   Skin:     General: Skin is warm and dry.   Neurological:      General: No focal deficit present.      Mental Status: She is alert.   Psychiatric:         Mood and Affect: Mood normal.         Behavior: Behavior normal.         Thought Content: Thought content normal.         Judgment: Judgment normal.         Allergies  Patient has no known allergies.     Current Medications    Current Outpatient Medications:     aspirin 81 mg EC tablet, Take 1 tablet (81 mg) by mouth once daily., Disp: , Rfl:     atorvastatin (Lipitor) 40 mg tablet, Take 1 tablet (40 mg) by mouth once daily at bedtime., Disp: , Rfl:     clopidogrel (Plavix) 75 mg tablet, Take 1 tablet (75 mg) by mouth once daily., Disp: , Rfl:     levothyroxine (Synthroid, Levoxyl) 50 mcg tablet, Take 1 tablet (50 mcg) by mouth once daily in the morning. Take before meals., Disp: , Rfl:     metoprolol tartrate (Lopressor) 25 mg tablet, Take 1 tablet (25 mg) by mouth 2 times a day., Disp: 180 tablet, Rfl: 3    mirtazapine (Remeron) 15 mg tablet, Take 0.5 tablets (7.5 mg) by mouth once daily at bedtime., Disp: , Rfl:     nitroglycerin (Nitrostat) 0.4 mg SL tablet, Place under the tongue., Disp: , Rfl:                      Assessment/Plan   1. " Atherosclerosis of coronary artery of native heart without angina pectoris, unspecified vessel or lesion type  Follow Up In Cardiology    Alanine Aminotransferase    Aspartate Aminotransferase    Basic Metabolic Panel    CBC    Lipid Panel    Follow Up In Cardiology    Alanine Aminotransferase    Aspartate Aminotransferase    Basic Metabolic Panel    CBC    Lipid Panel      2. Paroxysmal atrial fibrillation (Multi)  Basic Metabolic Panel    CBC    Basic Metabolic Panel    CBC      3. Bilateral carotid artery stenosis        4. Mixed hyperlipidemia  Alanine Aminotransferase    Aspartate Aminotransferase    Lipid Panel    Alanine Aminotransferase    Aspartate Aminotransferase    Lipid Panel      5. Status post coronary angioplasty        6. TIA (transient ischemic attack)        7. Underweight        8. Never smoked tobacco                 Scribe Attestation  By signing my name below, IRashida LPN   , Fausto   attest that this documentation has been prepared under the direction and in the presence of Bassam Pascual MD.     Provider Attestation - Scribe documentation    All medical record entries made by the Scribe were at my direction and personally dictated by me. I have reviewed the chart and agree that the record accurately reflects my personal performance of the history, physical exam, discussion and plan.

## 2025-03-13 NOTE — PATIENT INSTRUCTIONS
Please bring all medicines, vitamins, and herbal supplements with you when you come to the office.    Prescriptions will not be filled unless you are compliant with your follow up appointments or have a follow up appointment scheduled as per instruction of your physician. Refills should be requested at the time of your visit.     BMI was below normal measurement. Current weight: 45.8 kg (101 lb)  Weight change since last visit (-) denotes wt loss 5 lbs   Weight gain needed to achieve  BMI of 18.5 = 9.9 Lbs    Provided dietary education for weight gain to the patient.    Lab work  Same medications  Follow up 6 months

## 2025-03-20 ENCOUNTER — APPOINTMENT (OUTPATIENT)
Dept: CARDIOLOGY | Facility: CLINIC | Age: OVER 89
End: 2025-03-20
Payer: COMMERCIAL

## 2025-10-31 ENCOUNTER — APPOINTMENT (OUTPATIENT)
Dept: CARDIOLOGY | Facility: CLINIC | Age: OVER 89
End: 2025-10-31
Payer: MEDICARE